# Patient Record
Sex: MALE | Race: WHITE | NOT HISPANIC OR LATINO | Employment: FULL TIME | RURAL
[De-identification: names, ages, dates, MRNs, and addresses within clinical notes are randomized per-mention and may not be internally consistent; named-entity substitution may affect disease eponyms.]

---

## 2017-02-07 ENCOUNTER — OFFICE VISIT (OUTPATIENT)
Dept: CARDIOLOGY | Facility: CLINIC | Age: 52
End: 2017-02-07

## 2017-02-07 VITALS
HEART RATE: 59 BPM | DIASTOLIC BLOOD PRESSURE: 89 MMHG | WEIGHT: 225 LBS | BODY MASS INDEX: 32.21 KG/M2 | SYSTOLIC BLOOD PRESSURE: 133 MMHG | HEIGHT: 70 IN

## 2017-02-07 DIAGNOSIS — R07.2 PRECORDIAL PAIN: Primary | ICD-10-CM

## 2017-02-07 PROCEDURE — 99203 OFFICE O/P NEW LOW 30 MIN: CPT | Performed by: INTERNAL MEDICINE

## 2017-02-07 PROCEDURE — 93000 ELECTROCARDIOGRAM COMPLETE: CPT | Performed by: INTERNAL MEDICINE

## 2017-02-07 RX ORDER — METOPROLOL SUCCINATE 50 MG/1
50 TABLET, EXTENDED RELEASE ORAL DAILY
COMMUNITY
End: 2017-02-07 | Stop reason: SDUPTHER

## 2017-02-07 RX ORDER — METOPROLOL SUCCINATE 50 MG/1
50 TABLET, EXTENDED RELEASE ORAL DAILY
Qty: 90 TABLET | Refills: 3 | Status: SHIPPED | OUTPATIENT
Start: 2017-02-07

## 2017-02-07 RX ORDER — FINASTERIDE 5 MG/1
5 TABLET, FILM COATED ORAL DAILY
COMMUNITY

## 2017-02-07 RX ORDER — RANITIDINE 150 MG/1
150 TABLET ORAL 2 TIMES DAILY
COMMUNITY

## 2017-02-07 RX ORDER — TAMSULOSIN HYDROCHLORIDE 0.4 MG/1
1 CAPSULE ORAL NIGHTLY
COMMUNITY

## 2017-02-07 NOTE — PROGRESS NOTES
Encounter Date:02/07/2017    Location: [facility]    Chano Malik MD     Self Referred    Patient ID: Willam Victoria is a 51 y.o. male [place], [place].    1965  Subjective:      Chief Complaint/Reason for visit:    Chief Complaint   Patient presents with   • Establish Care       Problem List:  1. Chest Pain        A. Normal Myocardial Perfusion Study 2012        B. Normal Myocardial Perfusion Study 10/24/14, [facility]        C. Echocardiogram 10/24/14 revealing nl LVEF 60-65%, trace MR, Mild TR.  2. Nephrolithiasis  3. Obesity  4. Hyperlipidemia-diet controlled  5. Diarrhea      HPI:  Mr. Victoria is a 51 yr old white male with the above noted medical history who presents self referred to establish cardiac care.  Mr. Victoria has a history of chest pain and underwent normal stress tests in 2012 and 2014. He also had a normal echo in 2014.  He is a previous patient of Dr. Paniagua.  Currently he is doing well and denies chest pain pressure or tightness.      Social History     Social History   • Marital status:      Spouse name: N/A   • Number of children: N/A   • Years of education: N/A     Occupational History   • Not on file.     Social History Main Topics   • Smoking status: Never Smoker   • Smokeless tobacco: Not on file   • Alcohol use No   • Drug use: No   • Sexual activity: Defer     Other Topics Concern   • Not on file     Social History Narrative   • No narrative on file       family history includes Asthma in his father; Heart attack in his father; No Known Problems in his brother, mother, and sister.     has a past medical history of Chest pain; Hyperlipidemia; Nephrolithiasis; and Septic arthritis.    Allergies   Allergen Reactions   • Tylenol [Acetaminophen]          Current Outpatient Prescriptions:   •  finasteride (PROSCAR) 5 MG tablet, Take 5 mg by mouth Daily., Disp: , Rfl:   •  metoprolol succinate XL (TOPROL-XL) 50 MG 24 hr tablet, Take 50 mg by mouth Daily., Disp: , Rfl:   •   raNITIdine (ZANTAC) 150 MG tablet, Take 150 mg by mouth 2 (Two) Times a Day., Disp: , Rfl:   •  tamsulosin (FLOMAX) 0.4 MG capsule 24 hr capsule, Take 1 capsule by mouth Every Night., Disp: , Rfl:     Review of Systems   Constitution: Negative.   HENT: Negative.    Eyes: Negative.    Cardiovascular: Negative for chest pain, dyspnea on exertion, irregular heartbeat, leg swelling, near-syncope, orthopnea, palpitations, paroxysmal nocturnal dyspnea and syncope.   Respiratory: Negative.    Skin: Negative.    Gastrointestinal: Positive for diarrhea.   Genitourinary: Positive for frequency.   Neurological: Negative.    Psychiatric/Behavioral: Negative.    Allergic/Immunologic: Negative.        Vitals:    02/07/17 1105   BP: 133/89   Pulse: 59      B/P rechecked per Dr. Malin 118/80    Objective:       Physical Exam   Constitutional: He is oriented to person, place, and time. He appears well-developed and well-nourished.   HENT:   Head: Normocephalic and atraumatic.   Neck: Normal range of motion. No thyromegaly present.   Cardiovascular: Normal rate, normal heart sounds and intact distal pulses.  Exam reveals no gallop and no friction rub.    No murmur heard.  Pulmonary/Chest: Effort normal and breath sounds normal. No respiratory distress. He has no wheezes. He has no rales. He exhibits no tenderness.   Musculoskeletal: Normal range of motion. He exhibits no edema.   Neurological: He is alert and oriented to person, place, and time.   Skin: Skin is warm and dry.   Psychiatric: He has a normal mood and affect. His behavior is normal. Judgment and thought content normal.   Nursing note and vitals reviewed.      Data Review:     ECG 12 Lead  Date/Time: 2/7/2017 11:53 AM  Performed by: IVAN MALIN  Authorized by: IVAN MALIN   Comparison: compared with previous ECG   Comparison to previous ECG: No change from previous stress test report    Rhythm: sinus bradycardia  Rate: bradycardic  BPM: 59  Conduction: LPFB  QRS axis:  indeterminate  Clinical impression: abnormal ECG          Assessment & Plan:     Problem List Items Addressed This Visit        Nervous and Auditory    Precordial pain - Primary          Willam was seen today for establish care with a history of chest pain, currently asymptomatic.    Diagnoses and all orders for this visit:    Precordial pain    Other orders  -     ECG 12 Lead    PLAN:  1. Patient is currently stable and asymptomatic from cardiac standpoint without any symptoms of angina or CHF.  No current palpitations.  2. Continue Toprol Xl  3. Consider adding Statin if LDL > 100  4. Follow up in 12 months.      Scribed for Lloyd Malin MD by Naheed Martinez RN. 2/7/2017  11:55 AM     I, Lloyd Malin MD, personally performed the services described in this documentation as scribed by the above named individual in my presence, and it is both accurate and complete.  2/7/2017  2:24 PM